# Patient Record
Sex: MALE | Race: BLACK OR AFRICAN AMERICAN | URBAN - METROPOLITAN AREA
[De-identification: names, ages, dates, MRNs, and addresses within clinical notes are randomized per-mention and may not be internally consistent; named-entity substitution may affect disease eponyms.]

---

## 2019-03-06 ENCOUNTER — OFFICE VISIT (OUTPATIENT)
Dept: URBAN - METROPOLITAN AREA CLINIC 32 | Facility: CLINIC | Age: 59
End: 2019-03-06
Payer: COMMERCIAL

## 2019-03-06 DIAGNOSIS — H52.4 PRESBYOPIA: Primary | ICD-10-CM

## 2019-03-06 PROCEDURE — 92015 DETERMINE REFRACTIVE STATE: CPT | Performed by: OPTOMETRIST

## 2019-03-06 PROCEDURE — 92004 COMPRE OPH EXAM NEW PT 1/>: CPT | Performed by: OPTOMETRIST

## 2019-03-06 ASSESSMENT — INTRAOCULAR PRESSURE
OS: 19
OD: 19

## 2019-03-06 ASSESSMENT — VISUAL ACUITY
OS: 20/20
OD: 20/30

## 2019-03-06 NOTE — IMPRESSION/PLAN
Impression: Open angle with borderline findings, low risk, bilateral: H40.013.  Plan: PT ed on condition RTC TA DE OCT [facility]

## 2019-04-10 ENCOUNTER — OFFICE VISIT (OUTPATIENT)
Dept: URBAN - METROPOLITAN AREA CLINIC 32 | Facility: CLINIC | Age: 59
End: 2019-04-10
Payer: COMMERCIAL

## 2019-04-10 DIAGNOSIS — H40.013 OPEN ANGLE WITH BORDERLINE FINDINGS, LOW RISK, BILATERAL: Primary | ICD-10-CM

## 2019-04-10 PROCEDURE — 92083 EXTENDED VISUAL FIELD XM: CPT | Performed by: OPTOMETRIST

## 2019-04-10 PROCEDURE — 99214 OFFICE O/P EST MOD 30 MIN: CPT | Performed by: OPTOMETRIST

## 2019-04-10 PROCEDURE — 76514 ECHO EXAM OF EYE THICKNESS: CPT | Performed by: OPTOMETRIST

## 2019-04-10 PROCEDURE — 92133 CPTRZD OPH DX IMG PST SGM ON: CPT | Performed by: OPTOMETRIST

## 2019-04-10 ASSESSMENT — INTRAOCULAR PRESSURE
OD: 16
OS: 16

## 2019-04-10 NOTE — IMPRESSION/PLAN
Impression: Open angle with borderline findings, low risk, bilateral: H40.013. Plan: Pt ed on condition HVF normal, OCT shows good NFL but larg CD ratio.  no family history RTC 6 months tA DE COT HVF

## 2019-10-09 ENCOUNTER — OFFICE VISIT (OUTPATIENT)
Dept: URBAN - METROPOLITAN AREA CLINIC 32 | Facility: CLINIC | Age: 59
End: 2019-10-09
Payer: COMMERCIAL

## 2019-10-09 PROCEDURE — 92133 CPTRZD OPH DX IMG PST SGM ON: CPT | Performed by: OPTOMETRIST

## 2019-10-09 PROCEDURE — 76514 ECHO EXAM OF EYE THICKNESS: CPT | Performed by: OPTOMETRIST

## 2019-10-09 PROCEDURE — 92083 EXTENDED VISUAL FIELD XM: CPT | Performed by: OPTOMETRIST

## 2019-10-09 PROCEDURE — 99214 OFFICE O/P EST MOD 30 MIN: CPT | Performed by: OPTOMETRIST

## 2019-10-09 ASSESSMENT — INTRAOCULAR PRESSURE
OS: 15
OD: 14

## 2019-10-09 NOTE — IMPRESSION/PLAN
Impression: Open angle with borderline findings, low risk, bilateral: H40.013.  Plan: HVF OCt normal 93/101 large ONH RTC 5 months DE OCT HVF

## 2020-07-31 ENCOUNTER — OFFICE VISIT (OUTPATIENT)
Dept: URBAN - METROPOLITAN AREA CLINIC 32 | Facility: CLINIC | Age: 60
End: 2020-07-31
Payer: COMMERCIAL

## 2020-07-31 PROCEDURE — 92133 CPTRZD OPH DX IMG PST SGM ON: CPT | Performed by: OPTOMETRIST

## 2020-07-31 PROCEDURE — 92014 COMPRE OPH EXAM EST PT 1/>: CPT | Performed by: OPTOMETRIST

## 2020-07-31 PROCEDURE — 92083 EXTENDED VISUAL FIELD XM: CPT | Performed by: OPTOMETRIST

## 2020-07-31 ASSESSMENT — INTRAOCULAR PRESSURE
OD: 18
OS: 19

## 2020-07-31 NOTE — IMPRESSION/PLAN
Impression: Open angle with borderline findings, low risk, bilateral: H40.013.  Plan: HVF OCt normal 98/100 large ONH RTC 5 months DE OCT HVF, slight enlargd blind spot OD

## 2020-08-10 ENCOUNTER — OFFICE VISIT (OUTPATIENT)
Dept: URBAN - METROPOLITAN AREA CLINIC 32 | Facility: CLINIC | Age: 60
End: 2020-08-10
Payer: COMMERCIAL

## 2020-08-10 PROCEDURE — 92014 COMPRE OPH EXAM EST PT 1/>: CPT | Performed by: OPTOMETRIST

## 2020-08-10 ASSESSMENT — VISUAL ACUITY
OD: 20/25
OS: 20/25

## 2020-08-10 ASSESSMENT — INTRAOCULAR PRESSURE
OS: 17
OD: 18

## 2021-02-17 ENCOUNTER — OFFICE VISIT (OUTPATIENT)
Dept: URBAN - METROPOLITAN AREA CLINIC 32 | Facility: CLINIC | Age: 61
End: 2021-02-17
Payer: COMMERCIAL

## 2021-02-17 DIAGNOSIS — H10.413 CONJUNCTIVITIS - ALLERGIC: ICD-10-CM

## 2021-02-17 PROCEDURE — 99214 OFFICE O/P EST MOD 30 MIN: CPT | Performed by: OPTOMETRIST

## 2021-02-17 RX ORDER — OLOPATADINE HYDROCHLORIDE 2 MG/ML
0.2 % SOLUTION OPHTHALMIC
Qty: 15 | Refills: 6 | Status: ACTIVE
Start: 2021-02-17

## 2021-02-17 ASSESSMENT — INTRAOCULAR PRESSURE
OS: 18
OD: 16

## 2021-08-18 ENCOUNTER — OFFICE VISIT (OUTPATIENT)
Dept: URBAN - METROPOLITAN AREA CLINIC 32 | Facility: CLINIC | Age: 61
End: 2021-08-18
Payer: COMMERCIAL

## 2021-08-18 PROCEDURE — 92083 EXTENDED VISUAL FIELD XM: CPT | Performed by: OPTOMETRIST

## 2021-08-18 PROCEDURE — 99214 OFFICE O/P EST MOD 30 MIN: CPT | Performed by: OPTOMETRIST

## 2021-08-18 ASSESSMENT — INTRAOCULAR PRESSURE
OD: 24
OS: 21

## 2021-08-27 ENCOUNTER — OFFICE VISIT (OUTPATIENT)
Dept: URBAN - METROPOLITAN AREA CLINIC 32 | Facility: CLINIC | Age: 61
End: 2021-08-27
Payer: COMMERCIAL

## 2021-08-27 PROCEDURE — 92014 COMPRE OPH EXAM EST PT 1/>: CPT | Performed by: OPTOMETRIST

## 2021-08-27 ASSESSMENT — INTRAOCULAR PRESSURE
OS: 24
OD: 23

## 2021-08-27 ASSESSMENT — VISUAL ACUITY
OD: 20/25
OS: 20/25

## 2021-08-27 ASSESSMENT — KERATOMETRY
OS: 43.00
OD: 42.88

## 2022-02-18 ENCOUNTER — OFFICE VISIT (OUTPATIENT)
Dept: URBAN - METROPOLITAN AREA CLINIC 32 | Facility: CLINIC | Age: 62
End: 2022-02-18
Payer: COMMERCIAL

## 2022-02-18 DIAGNOSIS — H04.123 DRY EYE SYNDROME: ICD-10-CM

## 2022-02-18 PROCEDURE — 92133 CPTRZD OPH DX IMG PST SGM ON: CPT | Performed by: OPTOMETRIST

## 2022-02-18 PROCEDURE — 99214 OFFICE O/P EST MOD 30 MIN: CPT | Performed by: OPTOMETRIST

## 2022-02-18 ASSESSMENT — INTRAOCULAR PRESSURE
OS: 18
OD: 18

## 2022-02-18 NOTE — IMPRESSION/PLAN
Impression: Dry Eye Syndrome: A87.807. Plan: Dry eyes account for the patient's complaints. There is no evidence of permanent changes to the cornea. Explained condition does not have a cure and will need artificial tears for maintenance.

## 2022-02-18 NOTE — IMPRESSION/PLAN
Impression: Open angle with borderline findings, low risk, bilateral: H40.013.  Plan: HVF OCt normal 101/100 large ONH RTC 6 months DE OCT HVF, slight enlargd blind spot OD

## 2022-08-18 ENCOUNTER — OFFICE VISIT (OUTPATIENT)
Dept: URBAN - METROPOLITAN AREA CLINIC 32 | Facility: CLINIC | Age: 62
End: 2022-08-18
Payer: COMMERCIAL

## 2022-08-18 DIAGNOSIS — H40.013 OPEN ANGLE WITH BORDERLINE FINDINGS, LOW RISK, BILATERAL: Primary | ICD-10-CM

## 2022-08-18 PROCEDURE — 99214 OFFICE O/P EST MOD 30 MIN: CPT | Performed by: OPTOMETRIST

## 2022-08-18 PROCEDURE — 92083 EXTENDED VISUAL FIELD XM: CPT | Performed by: OPTOMETRIST

## 2022-08-18 ASSESSMENT — INTRAOCULAR PRESSURE
OD: 15
OS: 15

## 2022-08-18 NOTE — IMPRESSION/PLAN
Impression: Open angle with borderline findings, low risk, bilateral: H40.013. Plan: IOP: 15/15 Target IOP: mid teens TMAX IOP: 23/24 Patient denies Sulfa allergy, Patient denies heart/lung diagnosis No medicated drops Family History: unknown Pachs: 560/556 HVF: OD slight enlarged blind spot, OS full Patient education on condition and the potential of optic nerve damage. Glaucoma drops are not recommended at this time. Recommend 12-month IOP check with RNFL-OCT and 24-2 HVF.

## 2024-11-15 ENCOUNTER — OFFICE VISIT (OUTPATIENT)
Dept: URBAN - METROPOLITAN AREA CLINIC 32 | Facility: CLINIC | Age: 64
End: 2024-11-15
Payer: MEDICAID

## 2024-11-15 DIAGNOSIS — H52.4 PRESBYOPIA: ICD-10-CM

## 2024-11-15 DIAGNOSIS — H40.013 OPEN ANGLE WITH BORDERLINE FINDINGS, LOW RISK, BILATERAL: Primary | ICD-10-CM

## 2024-11-15 DIAGNOSIS — H04.123 DRY EYE SYNDROME: ICD-10-CM

## 2024-11-15 PROCEDURE — 92014 COMPRE OPH EXAM EST PT 1/>: CPT | Performed by: OPTOMETRIST

## 2024-11-15 ASSESSMENT — INTRAOCULAR PRESSURE
OD: 18
OS: 18

## 2024-11-15 ASSESSMENT — VISUAL ACUITY
OS: 20/25
OD: 20/25

## 2024-11-15 ASSESSMENT — KERATOMETRY
OD: 43.00
OS: 42.63